# Patient Record
Sex: MALE | URBAN - METROPOLITAN AREA
[De-identification: names, ages, dates, MRNs, and addresses within clinical notes are randomized per-mention and may not be internally consistent; named-entity substitution may affect disease eponyms.]

---

## 2017-06-14 ENCOUNTER — PRE VISIT (OUTPATIENT)
Dept: UROLOGY | Facility: CLINIC | Age: 32
End: 2017-06-14

## 2017-06-14 NOTE — TELEPHONE ENCOUNTER
Patient coming in for f/u flow / PVR / questionnaires. Patient chart reviewed, My Chart message sent to patient to come with comfortably full bladder.

## 2017-06-19 ENCOUNTER — OFFICE VISIT (OUTPATIENT)
Dept: UROLOGY | Facility: CLINIC | Age: 32
End: 2017-06-19

## 2017-06-19 VITALS
DIASTOLIC BLOOD PRESSURE: 76 MMHG | HEIGHT: 77 IN | WEIGHT: 227.8 LBS | SYSTOLIC BLOOD PRESSURE: 118 MMHG | HEART RATE: 58 BPM | BODY MASS INDEX: 26.9 KG/M2

## 2017-06-19 ASSESSMENT — PAIN SCALES - GENERAL: PAINLEVEL: NO PAIN (0)

## 2017-06-19 NOTE — MR AVS SNAPSHOT
After Visit Summary   6/19/2017    Vinnie Guallpa    MRN: [id number]           Patient Information     Date Of Birth          1985        Visit Information        Provider Department      6/19/2017 1:00 PM Pramod Adams MD [facility] Pro[facility] Urologic Cancers        Today's Diagnoses     Other specified causes of urethral stricture    -  1      Care Instructions    Please come to you next appointment with a comfortable full bladder.    It was a pleasure meeting with you today.  Thank you for allowing me and my team the privilege of caring for you today.  YOU are the reason we are here, and I truly hope we provided you with the excellent service you deserve.  Please let us know if there is anything else we can do for you so that we can be sure you are leaving completely satisfied with your care experience.                  Follow-ups after your visit        Your next 10 appointments already scheduled     Sep 25, 2017  2:30 PM CDT   (Arrive by 2:15 PM)   Cystoscopy with Pramod Adams MD   [facility] Prostate [facility] Urologic Cancers ([facility])    [address] [place] [id number] [phone]              Who to contact     Please call your clinic at [phone] to:    Ask questions about your health    Make or cancel appointments    Discuss your medicines    Learn about your test results    Speak to your doctor   If you have compliments or concerns about an experience at your clinic, or if you wish to file a complaint, please contact [facility] Patient Relations at [phone] or email us at [email or web address]         Additional Information About Your Visit        MyChart Information     [employer] gives you secure access to your electronic health record. If you see a primary care provider, you can also send messages to your care team and make  "appointments. If you have questions, please call your primary care clinic.  If you do not have a primary care provider, please call [phone] and they will assist you.      [employer] is an electronic gateway that provides easy, online access to your medical records. With [employer], you can request a clinic appointment, read your test results, renew a prescription or communicate with your care team.     To access your existing account, please contact your [facility] or call [phone] for assistance.        Care EveryWhere ID     This is your Care EveryWhere ID. This could be used by other organizations to access your [facility] medical records  [id number]        Your Vitals Were     Pulse Height BMI (Body Mass Index)             58 1.956 m (6' 5\") 27.01 kg/m2          Blood Pressure from Last 3 Encounters:   06/19/17 118/76   12/19/16 (!) 133/91    Weight from Last 3 Encounters:   06/19/17 103.3 kg (227 lb 12.8 oz)   12/19/16 107.5 kg (237 lb)              We Performed the Following     COMPLEX UROFLOWMETRY     POST-VOID RESIDUAL BLADDER SCAN        Primary Care Provider    None Specified       No primary provider on file.        Thank you!     Thank you for choosing [facility] UROLOGY AND [facility] FOR PROSTATE AND UROLOGIC CANCERS  for your care. Our goal is always to provide you with excellent care. Hearing back from our patients is one way we can continue to improve our services. Please take a few minutes to complete the written survey that you may receive in the mail after your visit with us. Thank you!             Your Updated Medication List - Protect others around you: Learn how to safely use, store and throw away your medicines at www.disposemymeds.org.          This list is accurate as of: 6/19/17  1:41 PM.  Always use your most recent med list.                   Brand Name Dispense Instructions for use    OMEPRAZOLE PO            "

## 2017-06-19 NOTE — NURSING NOTE
"Chief Complaint   Patient presents with     RECHECK     follow up from surgery       Blood pressure 118/76, pulse 58, height 1.956 m (6' 5\"), weight 103.3 kg (227 lb 12.8 oz). Body mass index is 27.01 kg/(m^2).    Patient Active Problem List   Diagnosis     Other specified causes of urethral stricture       No Known Allergies    Current Outpatient Prescriptions   Medication Sig Dispense Refill     OMEPRAZOLE PO          Social History   Substance Use Topics     Smoking status: Never Smoker     Smokeless tobacco: Not on file     Alcohol use Yes      Comment: occasionally       CODIE Rosario  6/19/2017  1:17 PM       "

## 2017-06-19 NOTE — PATIENT INSTRUCTIONS
Please come to you next appointment with a comfortable full bladder.    It was a pleasure meeting with you today.  Thank you for allowing me and my team the privilege of caring for you today.  YOU are the reason we are here, and I truly hope we provided you with the excellent service you deserve.  Please let us know if there is anything else we can do for you so that we can be sure you are leaving completely satisfied with your care experience.

## 2017-06-19 NOTE — LETTER
6/19/2017       RE: Vinnie Guallpa  [address]  [facility] [address]     Dear Colleague,    Thank you for referring your patient, Vinnie Guallpa, to the [facility] UROLOGY AND INST FOR PROSTATE AND UROLOGIC CANCERS at [facility]. Please see a copy of my visit note below.    Urethrotomy Follow-up Visit     PRE-PROCEDURE DIAGNOSIS: History of urethral stricture  POST-PROCEDURE DIAGNOSIS: No evidence of urethral stricture     HISTORY: Vinnie Guallpa is a 32 year old man 9 months status-post urethrotomy for urethral stricture.     Today Mr. Guallpa states that he feels a little worse than he did 6 mos ago. Frequency of these sx is 1-2x/week. On the occasional day where his urination is not as good, he states that symptomatologically, his flow is a little worse, he's experiencing a little more urgency, urinary hesitation, etc. Generally feels like he is completely voiding. No problems with dribbling. Some of his sx seem to be correlated with bladder irritants like coffee, other sx seem to be associated with physical activity and pressures of sexual intercourse.     He denies blood in the semen and urine. Denies UTI/STI/STD. No hx of trauma.     BMG complaints: No  Positioning complaints: No  Perineal / Genital complaints: Yes; pain is minimal; numbness is absent  Urinary incontinence: No    Questionnaires reviewed. See flowsheet for details.    ASSESSMENT AND PLAN:    Completed a uroflow here at the office, which shows that Mr. Shaw flow is slightly plateaued. It appears that the stricture has started to return.Since he had a RUG/VCUG in 12/2016, our plan is to do a cystoscopy in August/September at the 1y post-op timepoint to re-evaluate.    No plan for surgery at this time. Will discuss again at the next visit.        Tommy Landers, MS4  Urology Sub-Intern    Scribe Disclosure:   I, Tommy Landers, am serving as a scribe; to document services  personally performed by Pramod Adasm MD based on data collection and the provider's statements to me.     Provider Disclosure:  I agree with above History, Review of Systems, Physical exam and Plan.  I have reviewed the content of the documentation and have edited it as needed. I have personally performed the services documented here and the documentation accurately represents those services and the decisions I have made.    Pramod Adams MD

## 2017-08-11 NOTE — PROGRESS NOTES
Urethrotomy Follow-up Visit     PRE-PROCEDURE DIAGNOSIS: History of urethral stricture  POST-PROCEDURE DIAGNOSIS: No evidence of urethral stricture     HISTORY: Vinnie Guallpa is a 32 year old man 9 months status-post urethrotomy for urethral stricture.     Today Mr. Guallpa states that he feels a little worse than he did 6 mos ago. Frequency of these sx is 1-2x/week. On the occasional day where his urination is not as good, he states that symptomatologically, his flow is a little worse, he's experiencing a little more urgency, urinary hesitation, etc. Generally feels like he is completely voiding. No problems with dribbling. Some of his sx seem to be correlated with bladder irritants like coffee, other sx seem to be associated with physical activity and pressures of sexual intercourse.     He denies blood in the semen and urine. Denies UTI/STI/STD. No hx of trauma.     BMG complaints: No  Positioning complaints: No  Perineal / Genital complaints: Yes; pain is minimal; numbness is absent  Urinary incontinence: No    Questionnaires reviewed. See flowsheet for details.    ASSESSMENT AND PLAN:    Completed a uroflow here at the office, which shows that Mr. Guallpa's flow is slightly plateaued. It appears that the stricture has started to return.Since he had a RUG/VCUG in 12/2016, our plan is to do a cystoscopy in August/September at the 1y post-op timepoint to re-evaluate.    No plan for surgery at this time. Will discuss again at the next visit.        Tommy Landers, MS4  Urology Sub-Intern    Scribe Disclosure:   I, Tommy Landers, am serving as a scribe; to document services personally performed by Pramod Adams MD based on data collection and the provider's statements to me.     Provider Disclosure:  I agree with above History, Review of Systems, Physical exam and Plan.  I have reviewed the content of the documentation and have edited it as needed. I have personally performed the services  documented here and the documentation accurately represents those services and the decisions I have made.    Pramod Adams MD     home

## 2017-09-18 ENCOUNTER — PRE VISIT (OUTPATIENT)
Dept: UROLOGY | Facility: CLINIC | Age: 32
End: 2017-09-18

## 2017-09-18 NOTE — TELEPHONE ENCOUNTER
Pt with a history of urethral stricture coming in for a post op one year cytsoscopy. Pt called and reminded to come with a full bladder for a flow/pvr.

## 2019-10-05 ENCOUNTER — HEALTH MAINTENANCE LETTER (OUTPATIENT)
Age: 34
End: 2019-10-05

## 2020-11-14 ENCOUNTER — HEALTH MAINTENANCE LETTER (OUTPATIENT)
Age: 35
End: 2020-11-14

## 2021-09-12 ENCOUNTER — HEALTH MAINTENANCE LETTER (OUTPATIENT)
Age: 36
End: 2021-09-12

## 2022-01-02 ENCOUNTER — HEALTH MAINTENANCE LETTER (OUTPATIENT)
Age: 37
End: 2022-01-02

## 2022-11-19 ENCOUNTER — HEALTH MAINTENANCE LETTER (OUTPATIENT)
Age: 37
End: 2022-11-19

## 2023-04-09 ENCOUNTER — HEALTH MAINTENANCE LETTER (OUTPATIENT)
Age: 38
End: 2023-04-09